# Patient Record
Sex: MALE | Race: OTHER | Employment: STUDENT | URBAN - METROPOLITAN AREA
[De-identification: names, ages, dates, MRNs, and addresses within clinical notes are randomized per-mention and may not be internally consistent; named-entity substitution may affect disease eponyms.]

---

## 2023-11-01 ENCOUNTER — OFFICE VISIT (OUTPATIENT)
Dept: FAMILY MEDICINE CLINIC | Facility: CLINIC | Age: 28
End: 2023-11-01
Payer: COMMERCIAL

## 2023-11-01 VITALS
HEART RATE: 72 BPM | OXYGEN SATURATION: 98 % | BODY MASS INDEX: 22.19 KG/M2 | TEMPERATURE: 98 F | DIASTOLIC BLOOD PRESSURE: 72 MMHG | WEIGHT: 155 LBS | HEIGHT: 70 IN | SYSTOLIC BLOOD PRESSURE: 102 MMHG | RESPIRATION RATE: 16 BRPM

## 2023-11-01 DIAGNOSIS — R06.2 WHEEZING DUE TO ALLERGY: ICD-10-CM

## 2023-11-01 DIAGNOSIS — K62.89 RECTAL IRRITATION: Primary | ICD-10-CM

## 2023-11-01 DIAGNOSIS — T78.40XA WHEEZING DUE TO ALLERGY: ICD-10-CM

## 2023-11-01 PROCEDURE — 99214 OFFICE O/P EST MOD 30 MIN: CPT | Performed by: FAMILY MEDICINE

## 2023-11-01 PROCEDURE — 3074F SYST BP LT 130 MM HG: CPT | Performed by: FAMILY MEDICINE

## 2023-11-01 PROCEDURE — 3078F DIAST BP <80 MM HG: CPT | Performed by: FAMILY MEDICINE

## 2023-11-01 PROCEDURE — 3008F BODY MASS INDEX DOCD: CPT | Performed by: FAMILY MEDICINE

## 2023-11-01 RX ORDER — ALBUTEROL SULFATE 90 UG/1
2 AEROSOL, METERED RESPIRATORY (INHALATION) EVERY 4 HOURS PRN
COMMUNITY
Start: 2022-04-28 | End: 2023-11-01

## 2023-11-01 RX ORDER — TRIAMCINOLONE ACETONIDE 1 MG/G
CREAM TOPICAL 2 TIMES DAILY PRN
Qty: 60 G | Refills: 3 | Status: SHIPPED | OUTPATIENT
Start: 2023-11-01

## 2023-11-01 RX ORDER — ALBUTEROL SULFATE 90 UG/1
2 AEROSOL, METERED RESPIRATORY (INHALATION) EVERY 4 HOURS PRN
Qty: 6.7 G | Refills: 1 | Status: SHIPPED | OUTPATIENT
Start: 2023-11-01

## 2023-11-10 ENCOUNTER — OFFICE VISIT (OUTPATIENT)
Dept: FAMILY MEDICINE CLINIC | Facility: CLINIC | Age: 28
End: 2023-11-10
Payer: COMMERCIAL

## 2023-11-10 VITALS
HEIGHT: 70 IN | DIASTOLIC BLOOD PRESSURE: 60 MMHG | SYSTOLIC BLOOD PRESSURE: 128 MMHG | RESPIRATION RATE: 16 BRPM | WEIGHT: 153.38 LBS | BODY MASS INDEX: 21.96 KG/M2 | TEMPERATURE: 97 F | HEART RATE: 82 BPM

## 2023-11-10 DIAGNOSIS — M25.562 ACUTE PAIN OF LEFT KNEE: Primary | ICD-10-CM

## 2023-11-10 PROCEDURE — 3078F DIAST BP <80 MM HG: CPT | Performed by: FAMILY MEDICINE

## 2023-11-10 PROCEDURE — 99213 OFFICE O/P EST LOW 20 MIN: CPT | Performed by: FAMILY MEDICINE

## 2023-11-10 PROCEDURE — 3008F BODY MASS INDEX DOCD: CPT | Performed by: FAMILY MEDICINE

## 2023-11-10 PROCEDURE — 3074F SYST BP LT 130 MM HG: CPT | Performed by: FAMILY MEDICINE

## 2023-11-10 RX ORDER — MELOXICAM 15 MG/1
15 TABLET ORAL DAILY
Qty: 10 TABLET | Refills: 0 | Status: SHIPPED | OUTPATIENT
Start: 2023-11-10 | End: 2023-11-20

## 2023-11-10 RX ORDER — METHYLPREDNISOLONE 4 MG/1
TABLET ORAL
Qty: 21 EACH | Refills: 0 | Status: SHIPPED | OUTPATIENT
Start: 2023-11-10

## 2023-11-15 ENCOUNTER — TELEPHONE (OUTPATIENT)
Dept: PHYSICAL THERAPY | Facility: HOSPITAL | Age: 28
End: 2023-11-15

## 2023-11-21 ENCOUNTER — OFFICE VISIT (OUTPATIENT)
Facility: LOCATION | Age: 28
End: 2023-11-21
Attending: FAMILY MEDICINE
Payer: COMMERCIAL

## 2023-11-21 DIAGNOSIS — M25.562 ACUTE PAIN OF LEFT KNEE: Primary | ICD-10-CM

## 2023-11-21 PROCEDURE — 97110 THERAPEUTIC EXERCISES: CPT

## 2023-11-21 PROCEDURE — 97161 PT EVAL LOW COMPLEX 20 MIN: CPT

## 2023-11-27 ENCOUNTER — OFFICE VISIT (OUTPATIENT)
Facility: LOCATION | Age: 28
End: 2023-11-27
Attending: FAMILY MEDICINE
Payer: COMMERCIAL

## 2023-11-27 PROCEDURE — 97110 THERAPEUTIC EXERCISES: CPT

## 2023-11-27 NOTE — PROGRESS NOTES
Dx: Acute pain of left knee (M25.562)           Authorized # of Visits:  8  Fall Risk: standard         Precautions: n/a             Subjective: The patient reports overall his knee has continued to feel better  Current Pain Ratin/10  Objective:   See flow sheet    Assessment:   The patient tolerated treatment well with no complaints of knee pain noted with exercises    Plan:   Continue PT to address soft tissue and joint restrictions and provide instruction in a progressive therapeutic exercise program with manual and verbal cueing for proper form and technique    Date: 2023  Tx#:  Date: Tx#: 3/ Date: Tx#: 4/ Date: Tx#: 5/ Date: Tx#: 6/ Date: Tx#: 7/ Date: Tx#: 8/   TherEx (40 min) TherEx TherEx TherEx TherEx TherEx TherEx   Upright bike L5 x 8 min         Bridging with hip abd with blue band 2 x 15         S/L clams blue band 2 x 15         Rockerboard AP/Lateral rocking and balance         Rockerboard AP single leg balance         Shuttle Bilateral 150# 3 x 15         Shuttle R/L 100# 2 x 15         Squats 12# bar x 10 with review of form/technique         Reviewed HEP         HEP: Sidestepping with band, Bridging, S/L clams with band    Charges:  TherEx x 3       Total Timed Treatment: 40 min  Total Treatment Time: 40 min

## 2023-11-29 ENCOUNTER — APPOINTMENT (OUTPATIENT)
Facility: LOCATION | Age: 28
End: 2023-11-29
Attending: FAMILY MEDICINE
Payer: COMMERCIAL

## 2023-12-06 ENCOUNTER — APPOINTMENT (OUTPATIENT)
Facility: LOCATION | Age: 28
End: 2023-12-06
Attending: FAMILY MEDICINE
Payer: COMMERCIAL

## 2023-12-11 ENCOUNTER — OFFICE VISIT (OUTPATIENT)
Facility: LOCATION | Age: 28
End: 2023-12-11
Attending: FAMILY MEDICINE
Payer: COMMERCIAL

## 2023-12-11 PROCEDURE — 97110 THERAPEUTIC EXERCISES: CPT

## 2023-12-11 NOTE — PROGRESS NOTES
Dx: Acute pain of left knee (M25.562)           Authorized # of Visits:  8  Fall Risk: standard         Precautions: n/a             Subjective:   Knee is a bit aggravated today; has not done any fitness/exercise since last week  Current Pain Ratin/10  Objective:   See flow sheet    Assessment:   The patient tolerated treatment well with no complaints of knee pain noted with exercises  Demonstrates some tightness/soft tissue restrictions lateral thigh    Plan:   Continue PT to address soft tissue and joint restrictions and provide instruction in a progressive therapeutic exercise program with manual and verbal cueing for proper form and technique    Date: 2023  Tx#:  Date: 23  Tx#: 3/8 Date: Tx#: 4/ Date: Tx#: / Date: Tx#: / Date: Tx#: 7/ Date: Tx#: 8/   TherEx (40 min) TherEx TherEx TherEx TherEx TherEx TherEx   Upright bike L5 x 8 min Upright bike L5 x 8 min        Bridging with hip abd with blue band 2 x 15 Bridging with hip abd with blue band 2 x 15        S/L clams blue band 2 x 15 S/L clams blue band 2 x 15        Rockerboard AP/Lateral rocking and balance Rockerboard AP/Lateral rocking and balance        Rockerboard AP single leg balance Rockerboard AP single leg balance (NT)        Shuttle Bilateral 150# 3 x 15 Shuttle Bilateral 175# 2 x 15    Shuttle R/L 100# 2 x 15          Shuttle R/L 100# 2 x 15 Squat w/BTB around knees x 20        Squats 12# bar x 10 with review of form/technique Standing clam BTB x 20        Reviewed HEP Side step & MW x 1 lap         Demonstration & return demonstration FR lateral quad, RF, ITB        HEP: Sidestepping with band, Bridging, S/L clams with band    Charges:  TherEx x 3       Total Timed Treatment: 40 min  Total Treatment Time: 40 min

## 2023-12-13 ENCOUNTER — OFFICE VISIT (OUTPATIENT)
Facility: LOCATION | Age: 28
End: 2023-12-13
Attending: FAMILY MEDICINE
Payer: COMMERCIAL

## 2023-12-13 PROCEDURE — 97140 MANUAL THERAPY 1/> REGIONS: CPT

## 2023-12-13 PROCEDURE — 97110 THERAPEUTIC EXERCISES: CPT

## 2023-12-13 NOTE — PROGRESS NOTES
Dx: Acute pain of left knee (M25.562)           Authorized # of Visits:  8  Fall Risk: standard         Precautions: n/a             Subjective:   Feeling a bit better today; did exercises and felt pretty good; a little achy at end of the day   Current Pain Ratin/10 at end of day    Objective:   See flow sheet    Assessment:   Tightness noted lateral thigh - improved with soft tissue treatment (encouraged some soft tissue work - FR, massage gun etc)  Good form with exercises, minimal cueing noted  Tolerated all exercises without pain     Plan:   Continue PT to address soft tissue and joint restrictions and provide instruction in a progressive therapeutic exercise program with manual and verbal cueing for proper form and technique    Date: 2023  Tx#:  Date: 23  Tx#: 3/8 Date: 23  Tx#:  Date: Tx#: 5/ Date: Tx#: 6/ Date: Tx#: 7/ Date:    Tx#: 8/   TherEx (40 min) TherEx TherEx (30 min) TherEx TherEx TherEx TherEx   Upright bike L5 x 8 min Upright bike L5 x 8 min Upright bike L5 x 8 min       Bridging with hip abd with blue band 2 x 15 Bridging with hip abd with blue band 2 x 15 Bridging with hip abd with blue band 2 x 15       S/L clams blue band 2 x 15 S/L clams blue band 2 x 15 S/L clams blue band 2 x 15       Rockerboard AP/Lateral rocking and balance Rockerboard AP/Lateral rocking and balance S/L hip abduction x 15 each       Rockerboard AP single leg balance Rockerboard AP single leg balance (NT) Shuttle Bilateral 175# 2 x 15    Shuttle R/L 125# 2 x 15       Shuttle Bilateral 150# 3 x 15 Shuttle Bilateral 175# 2 x 15    Shuttle R/L 100# 2 x 15   Squat w/BTB around knees x 20       Shuttle R/L 100# 2 x 15 Squat w/BTB around knees x 20 Standing clam BTB x 20       Squats 12# bar x 10 with review of form/technique Standing clam BTB x 20 BTB Side step x 2 laps    BTB MW x 2 laps       Reviewed HEP Side step & MW x 1 lap RDL x 10 each side with dowel         Demonstration & return demonstration FR lateral quad, RF, ITB          Manual Therapy (10 min)         Rolling stick to lateral quad, RF, ITB       HEP: Sidestepping with band, Bridging, S/L clams with band    Charges:  TherEx x 2; manual 1      Total Timed Treatment: 40 min  Total Treatment Time: 40 min

## 2023-12-18 ENCOUNTER — APPOINTMENT (OUTPATIENT)
Facility: LOCATION | Age: 28
End: 2023-12-18
Attending: FAMILY MEDICINE
Payer: COMMERCIAL

## 2023-12-20 ENCOUNTER — APPOINTMENT (OUTPATIENT)
Facility: LOCATION | Age: 28
End: 2023-12-20
Attending: FAMILY MEDICINE
Payer: COMMERCIAL

## 2024-05-10 ENCOUNTER — OFFICE VISIT (OUTPATIENT)
Dept: FAMILY MEDICINE CLINIC | Facility: CLINIC | Age: 29
End: 2024-05-10
Payer: COMMERCIAL

## 2024-05-10 VITALS
DIASTOLIC BLOOD PRESSURE: 62 MMHG | HEIGHT: 70 IN | TEMPERATURE: 98 F | OXYGEN SATURATION: 98 % | BODY MASS INDEX: 21.76 KG/M2 | RESPIRATION RATE: 16 BRPM | WEIGHT: 152 LBS | HEART RATE: 80 BPM | SYSTOLIC BLOOD PRESSURE: 90 MMHG

## 2024-05-10 DIAGNOSIS — H66.90 ACUTE OTITIS MEDIA, UNSPECIFIED OTITIS MEDIA TYPE: Primary | ICD-10-CM

## 2024-05-10 DIAGNOSIS — H60.502 ACUTE OTITIS EXTERNA OF LEFT EAR, UNSPECIFIED TYPE: ICD-10-CM

## 2024-05-10 PROCEDURE — 99213 OFFICE O/P EST LOW 20 MIN: CPT | Performed by: FAMILY MEDICINE

## 2024-05-10 RX ORDER — NEOMYCIN SULFATE, POLYMYXIN B SULFATE, HYDROCORTISONE 3.5; 10000; 1 MG/ML; [USP'U]/ML; MG/ML
2 SOLUTION/ DROPS AURICULAR (OTIC) 3 TIMES DAILY
Qty: 10 ML | Refills: 0 | Status: SHIPPED | OUTPATIENT
Start: 2024-05-10 | End: 2024-05-15

## 2024-05-10 RX ORDER — IBUPROFEN 200 MG
200 TABLET ORAL EVERY 6 HOURS PRN
COMMUNITY

## 2024-05-10 RX ORDER — AMOXICILLIN 500 MG/1
500 CAPSULE ORAL 3 TIMES DAILY
Qty: 30 CAPSULE | Refills: 0 | Status: SHIPPED | OUTPATIENT
Start: 2024-05-10 | End: 2024-05-20

## 2024-05-10 NOTE — PROGRESS NOTES
CHIEF COMPLAINT:     Chief Complaint   Patient presents with    Ear Pain     Patient c/o left ear pressure and feeling water in ear started yesterday        HPI:   Master Leon is a 28 year old male who presents to clinic today with complaints of pain in left ear. Has had for 1  days. Pain is described as painful fullness and started after he cleaned his ear and states \"I blew a huge chunk of wax out of my ear and it has hurt ever since.  Patient denies history of frequent ear infections. Home treatment includes cleaning ears with nasal aspirator (blue bulb) .     Associated symptoms:  Patient reports decreased hearing. Patient denies hearing loss. Patient denies drainage. Patient denies use of cotton tipped ear swabs to clean the ears. Patient reports following URI symptoms: ear pain    Current Outpatient Medications   Medication Sig Dispense Refill    ibuprofen 200 MG Oral Tab Take 1 tablet (200 mg total) by mouth every 6 (six) hours as needed for Pain.      amoxicillin 500 MG Oral Cap Take 1 capsule (500 mg total) by mouth 3 (three) times daily for 10 days. 30 capsule 0    Neomycin-Polymyxin-HC 1 % Otic Solution Place 2 drops in ear(s) in the morning, at noon, and at bedtime for 5 days. 10 mL 0    albuterol 108 (90 Base) MCG/ACT Inhalation Aero Soln Inhale 2 puffs into the lungs every 4 (four) hours as needed for Wheezing or Shortness of Breath. 6.7 g 1      Past Medical History:    Hematuria, microscopic      Social History:  Social History     Socioeconomic History    Marital status: Single   Tobacco Use    Smoking status: Never   Substance and Sexual Activity    Alcohol use: No    Drug use: No     Social Determinants of Health      [facility] from [facility], [facility]    Housing Stability        REVIEW OF SYSTEMS:   GENERAL: See HPI  SKIN: no unusual skin lesions or rashes  HEENT: See HPI  LUNGS: No shortness of breath, or wheezing.  CARDIOVASCULAR: No chest pain,  palpitations  GI: No N/V/C/D.  NEURO: denies headaches or dizziness    EXAM:   BP 90/62   Pulse 80   Temp 97.9 °F (36.6 °C) (Temporal)   Resp 16   Ht 5' 10\" (1.778 m)   Wt 152 lb (68.9 kg)   SpO2 98%   BMI 21.81 kg/m²   GENERAL: well developed, well nourished,in no apparent distress  SKIN: no rashes,no suspicious lesions  HEAD: atraumatic, normocephalic  EYES: conjunctiva clear, EOM intact  EARS: tender tragus and tender with manipulation.  External auditory canals red and inflammed. Right TM: Normal , not bulging, no retraction,no effusion, bony landmarks present.  Left TM: red, + bulging, - retraction,+ effusion, bony landmarks unable to assess..  NOSE: nostrils patent, no nasal mucus, nasal mucosa absent.  THROAT: oral mucosa pink, moist. Posterior pharynx is erythematous and injected. No exudates.  NECK: supple, non-tender  LUNGS: clear to auscultation bilaterally, no wheezes or rhonchi. Breathing is non labored.  CARDIO: RRR without murmur  EXTREMITIES: no cyanosis, clubbing or edema  LYMPH: negative lymphadenopathy.      ASSESSMENT AND PLAN:   Master Leon is a 28 year old male who presents with ear problems symptoms are consistent with    ASSESSMENT:  Encounter Diagnoses   Name Primary?    Acute otitis media, unspecified otitis media type Yes    Acute otitis externa of left ear, unspecified type        PLAN: Meds as listed below.  Comfort measures as described in Patient Instructions    Meds & Refills for this Visit:  Requested Prescriptions     Signed Prescriptions Disp Refills    amoxicillin 500 MG Oral Cap 30 capsule 0     Sig: Take 1 capsule (500 mg total) by mouth 3 (three) times daily for 10 days.    Neomycin-Polymyxin-HC 1 % Otic Solution 10 mL 0     Sig: Place 2 drops in ear(s) in the morning, at noon, and at bedtime for 5 days.         Risk and benefits of medication discussed.   If antibiotics prescribed, stressed importance of completing full course of antibiotic.     Acetaminophen or  NSAID prn pain.      Follow up with clinic if s/sx worsen, do not begin to improve in 3 days, or if fever of 100.4 or greater persists for 72 hours.      Patient voiced understand and is in agreement with treatment plan.    There are no Patient Instructions on file for this visit.

## 2024-09-17 ENCOUNTER — OFFICE VISIT (OUTPATIENT)
Dept: FAMILY MEDICINE CLINIC | Facility: CLINIC | Age: 29
End: 2024-09-17
Payer: COMMERCIAL

## 2024-09-17 VITALS
HEIGHT: 70 IN | TEMPERATURE: 97 F | OXYGEN SATURATION: 99 % | WEIGHT: 150.38 LBS | HEART RATE: 69 BPM | SYSTOLIC BLOOD PRESSURE: 100 MMHG | DIASTOLIC BLOOD PRESSURE: 60 MMHG | RESPIRATION RATE: 18 BRPM | BODY MASS INDEX: 21.53 KG/M2

## 2024-09-17 DIAGNOSIS — H60.503 ACUTE OTITIS EXTERNA OF BOTH EARS, UNSPECIFIED TYPE: Primary | ICD-10-CM

## 2024-09-17 DIAGNOSIS — H61.23 CERUMINOSIS, BILATERAL: ICD-10-CM

## 2024-09-17 PROCEDURE — 99214 OFFICE O/P EST MOD 30 MIN: CPT | Performed by: FAMILY MEDICINE

## 2024-09-17 RX ORDER — CIPROFLOXACIN AND DEXAMETHASONE 3; 1 MG/ML; MG/ML
4 SUSPENSION/ DROPS AURICULAR (OTIC) 2 TIMES DAILY
Qty: 7.5 ML | Refills: 0 | Status: SHIPPED | OUTPATIENT
Start: 2024-09-17

## 2024-09-17 NOTE — PROGRESS NOTES
CHIEF COMPLAINT:     Chief Complaint   Patient presents with    Follow - Up     Patient here for follow up on his ear infections. Patient states both his ears are still itchy and flaky and that his right ear tends to drain more.       HPI:   Master Leon is a 29 year old male who presents to clinic today with complaints of clogged ears.  Patient requesting ear flush. Patient reports ongoing issue with cerumen build up. Patient denies diminished hearing. Has had symptoms for 1  weeks. Home treatment includes antibiotic drops, once finished nothing.     Associated symptoms:  Patient reports drainage. Patient denies use of Q-tips to clean the ears.     Current Outpatient Medications   Medication Sig Dispense Refill    ciprofloxacin-dexamethasone 0.3-0.1 % Otic Suspension Place 4 drops into both ears 2 (two) times daily. 7.5 mL 0    albuterol 108 (90 Base) MCG/ACT Inhalation Aero Soln Inhale 2 puffs into the lungs every 4 (four) hours as needed for Wheezing or Shortness of Breath. 6.7 g 1    ibuprofen 200 MG Oral Tab Take 1 tablet (200 mg total) by mouth every 6 (six) hours as needed for Pain. (Patient not taking: Reported on 9/17/2024)        Past Medical History:    Hematuria, microscopic      Social History:  Social History     Socioeconomic History    Marital status:    Tobacco Use    Smoking status: Never    Smokeless tobacco: Never   Vaping Use    Vaping status: Never Used   Substance and Sexual Activity    Alcohol use: No    Drug use: No     Social Determinants of Health      [facility], [facility]    Housing Stability        REVIEW OF SYSTEMS:   GENERAL: Feeling well otherwise.    HEENT: See HPI      EXAM:   /60 (BP Location: Left arm, Patient Position: Sitting, Cuff Size: adult)   Pulse 69   Temp 97.1 °F (36.2 °C) (Temporal)   Resp 18   Ht 5' 10\" (1.778 m)   Wt 150 lb 6.4 oz (68.2 kg)   SpO2 99%   BMI 21.58 kg/m²   GENERAL: well  developed, well nourished,in no apparent distress    Cerumen Removal Procedure  Patient gave verbal consent.   Risks and benefits of removal were discussed with the patient. Patient agreed to proceed with procedure.    Location: bilateral ears   Indication: TM not visible,fullness.  Prep: Hydrogen Peroxide with warm water via ear elephant.  Removal: Lavage alone was not successful.  There was need for curette/cerumen spoon.  Patient Status: Tolerated Well; No complications      EXAM: TMs healthy, no injection, fluid, or retraction. EACs healthy and without lesions.     ASSESSMENT AND PLAN:   Master Leon is a 29 year old male who presents with \"clogged ears\" possible drainage    ASSESSMENT:  Encounter Diagnoses   Name Primary?    Acute otitis externa of both ears, unspecified type Yes    Ceruminosis, bilateral        PLAN:  Successful cerumen removal. Patient tolerated well without complications.      Patient questions answered.  No further concerns.  Patient will follow up as needed.

## 2024-12-19 ENCOUNTER — OFFICE VISIT (OUTPATIENT)
Dept: FAMILY MEDICINE CLINIC | Facility: CLINIC | Age: 29
End: 2024-12-19
Payer: COMMERCIAL

## 2024-12-19 VITALS
OXYGEN SATURATION: 100 % | WEIGHT: 150 LBS | HEART RATE: 75 BPM | DIASTOLIC BLOOD PRESSURE: 54 MMHG | SYSTOLIC BLOOD PRESSURE: 114 MMHG | HEIGHT: 70 IN | BODY MASS INDEX: 21.47 KG/M2 | RESPIRATION RATE: 16 BRPM | TEMPERATURE: 97 F

## 2024-12-19 DIAGNOSIS — H66.005 RECURRENT ACUTE SUPPURATIVE OTITIS MEDIA WITHOUT SPONTANEOUS RUPTURE OF LEFT TYMPANIC MEMBRANE: ICD-10-CM

## 2024-12-19 DIAGNOSIS — H60.392 OTHER INFECTIVE ACUTE OTITIS EXTERNA OF LEFT EAR: Primary | ICD-10-CM

## 2024-12-19 RX ORDER — CIPROFLOXACIN AND DEXAMETHASONE 3; 1 MG/ML; MG/ML
4 SUSPENSION/ DROPS AURICULAR (OTIC) 2 TIMES DAILY
Qty: 7.5 ML | Refills: 0 | Status: SHIPPED | OUTPATIENT
Start: 2024-12-19 | End: 2024-12-20

## 2024-12-19 NOTE — PROGRESS NOTES
Subjective:      Chief Complaint   Patient presents with    Ear Pain     Left ear swelling, Crust around ear. 4 ear infections in 2024. Alternating ears. No OTC Ear Drops. Was prescribed antibiotics with each ear infection    Vaccinations     Pt denied vaccinations     HISTORY OF PRESENT ILLNESS  Ear Pain       HPI obtained per patient report.  Master Leon is a pleasant 29 year old male presenting with L ear pain.   He reports L ear pain for a few days. He denies any other symptoms. He has had 4 ear infections within the past year and has an ENT evaluation upcoming. He denies swimming, submersion in water, local trauma, q-tip use.    PAST PATIENT HISTORY  Past Medical History:    Hematuria, microscopic     No past surgical history on file.    CURRENT MEDICATIONS  Medications Taking[1]    HEALTH MAINTENANCE  Immunization History   Administered Date(s) Administered    Covid-19 Vaccine Pfizer 30 mcg/0.3 ml 06/25/2021, 07/16/2021    HEP A,Ped/Adol,(2 Dose) 08/16/2013    Meningococcal-Menactra 08/16/2013    TDAP 08/16/2013       ALLERGIES AND DRUG REACTIONS  Allergies[2]    Family History   Problem Relation Age of Onset    Diabetes Maternal Grandmother     Diabetes Paternal Grandfather      Social History     Socioeconomic History    Marital status:    Tobacco Use    Smoking status: Never    Smokeless tobacco: Never   Vaping Use    Vaping status: Never Used   Substance and Sexual Activity    Alcohol use: No    Drug use: No     Social Drivers of Health      Received from [facility], [facility]    Housing Stability       Review of Systems   HENT:  Positive for ear pain.    All other systems reviewed and are negative.         Objective:      /54 (BP Location: Left arm, Patient Position: Sitting, Cuff Size: adult)   Pulse 75   Temp 97.3 °F (36.3 °C) (Temporal)   Resp 16   Ht 5' 10\" (1.778 m)   Wt 150 lb (68 kg)   SpO2 100%   BMI 21.52 kg/m²   Body mass index is  21.52 kg/m².    Physical Exam  Vitals reviewed.   Constitutional:       General: He is not in acute distress.     Appearance: He is not ill-appearing, toxic-appearing or diaphoretic.   HENT:      Head: Normocephalic and atraumatic.      Right Ear: Tympanic membrane, ear canal and external ear normal.      Ears:      Comments: L TM mildly erythematous and bulging  L EAC erythematous  External ear mildly tender  Eyes:      General: No scleral icterus.        Right eye: No discharge.         Left eye: No discharge.      Extraocular Movements: Extraocular movements intact.      Conjunctiva/sclera: Conjunctivae normal.   Cardiovascular:      Rate and Rhythm: Normal rate.   Pulmonary:      Effort: Pulmonary effort is normal.   Abdominal:      General: Abdomen is flat.   Musculoskeletal:      Cervical back: Neck supple.   Skin:     General: Skin is warm and dry.      Coloration: Skin is not jaundiced or pale.      Findings: No bruising, erythema or rash.   Neurological:      Mental Status: He is alert and oriented to person, place, and time.            Assessment and Plan:      1. Other infective acute otitis externa of left ear (Primary)  -     Ciprofloxacin-dexAMETHasone; Place 4 drops into the left ear 2 (two) times daily for 7 days.  Dispense: 7.5 mL; Refill: 0  2. Recurrent acute suppurative otitis media without spontaneous rupture of left tympanic membrane  -     Amoxicillin-Pot Clavulanate; Take 1 tablet by mouth 2 (two) times daily for 7 days.  Dispense: 14 tablet; Refill: 0    Return if symptoms worsen or fail to improve.    - recommended ciprodex ear drops and augmentin as prescribed  - he does not have any clear underlying risk factors for his frequent ear infections this year. He was encouraged to take probiotics during and after his course of antibiotics      Patient verbalized understanding of assessment and recommendations. All questions and concerns were addressed.    Electronically signed by Ho Peacock  MD       [1]   Outpatient Medications Marked as Taking for the 12/19/24 encounter (Office Visit) with Ho Peacock MD   Medication Sig Dispense Refill    ciprofloxacin-dexamethasone 0.3-0.1 % Otic Suspension Place 4 drops into the left ear 2 (two) times daily for 7 days. 7.5 mL 0    amoxicillin clavulanate 875-125 MG Oral Tab Take 1 tablet by mouth 2 (two) times daily for 7 days. 14 tablet 0    albuterol 108 (90 Base) MCG/ACT Inhalation Aero Soln Inhale 2 puffs into the lungs every 4 (four) hours as needed for Wheezing or Shortness of Breath. 6.7 g 1   [2] No Known Allergies

## 2024-12-19 NOTE — PROGRESS NOTES
Subjective:      Chief Complaint   Patient presents with    Ear Pain     Left ear swelling, Crust around ear. 4 ear infections in 2024. Alternating ears. No OTC Ear Drops. Was prescribed antibiotics with each ear infection    Vaccinations     Pt denied vaccinations     HISTORY OF PRESENT ILLNESS  HPI  HPI obtained per patient report.  Master Leon is a pleasant 29 year old male presenting     PAST PATIENT HISTORY  Past Medical History:    Hematuria, microscopic     No past surgical history on file.    CURRENT MEDICATIONS  Medications Taking[1]    HEALTH MAINTENANCE  Immunization History   Administered Date(s) Administered    Covid-19 Vaccine Pfizer 30 mcg/0.3 ml 06/25/2021, 07/16/2021    HEP A,Ped/Adol,(2 Dose) 08/16/2013    Meningococcal-Menactra 08/16/2013    TDAP 08/16/2013       ALLERGIES AND DRUG REACTIONS  Allergies[2]    Family History   Problem Relation Age of Onset    Diabetes Maternal Grandmother     Diabetes Paternal Grandfather      Social History     Socioeconomic History    Marital status:    Tobacco Use    Smoking status: Never    Smokeless tobacco: Never   Vaping Use    Vaping status: Never Used   Substance and Sexual Activity    Alcohol use: No    Drug use: No     Social Drivers of Health      Received from [facility], [facility]    Housing Stability       Review of Systems       Objective:      /54 (BP Location: Left arm, Patient Position: Sitting, Cuff Size: adult)   Pulse 75   Temp 97.3 °F (36.3 °C) (Temporal)   Resp 16   Ht 5' 10\" (1.778 m)   Wt 150 lb (68 kg)   SpO2 100%   BMI 21.52 kg/m²   Body mass index is 21.52 kg/m².    Physical Exam       Assessment and Plan:      There are no diagnoses linked to this encounter.  No follow-ups on file.        Patient verbalized understanding of assessment and recommendations. All questions and concerns were addressed.    Electronically signed by Ho Peacock MD       [1]   Outpatient Medications  Marked as Taking for the 12/19/24 encounter (Office Visit) with Ho Peacock MD   Medication Sig Dispense Refill    albuterol 108 (90 Base) MCG/ACT Inhalation Aero Soln Inhale 2 puffs into the lungs every 4 (four) hours as needed for Wheezing or Shortness of Breath. 6.7 g 1   [2] No Known Allergies

## 2024-12-20 ENCOUNTER — PATIENT MESSAGE (OUTPATIENT)
Dept: FAMILY MEDICINE CLINIC | Facility: CLINIC | Age: 29
End: 2024-12-20

## 2024-12-20 DIAGNOSIS — H60.392 OTHER INFECTIVE ACUTE OTITIS EXTERNA OF LEFT EAR: Primary | ICD-10-CM

## 2024-12-20 RX ORDER — NEOMYCIN SULFATE, POLYMYXIN B SULFATE AND HYDROCORTISONE 10; 3.5; 1 MG/ML; MG/ML; [USP'U]/ML
4 SUSPENSION/ DROPS AURICULAR (OTIC) 4 TIMES DAILY
Qty: 10 ML | Refills: 0 | Status: SHIPPED | OUTPATIENT
Start: 2024-12-20 | End: 2024-12-27

## 2025-01-16 ENCOUNTER — OFFICE VISIT (OUTPATIENT)
Dept: FAMILY MEDICINE CLINIC | Facility: CLINIC | Age: 30
End: 2025-01-16
Payer: COMMERCIAL

## 2025-01-16 VITALS
SYSTOLIC BLOOD PRESSURE: 110 MMHG | WEIGHT: 151.5 LBS | DIASTOLIC BLOOD PRESSURE: 60 MMHG | HEIGHT: 70 IN | BODY MASS INDEX: 21.69 KG/M2 | RESPIRATION RATE: 16 BRPM | OXYGEN SATURATION: 99 % | TEMPERATURE: 98 F | HEART RATE: 84 BPM

## 2025-01-16 DIAGNOSIS — H93.233 HYPERACUSIS OF BOTH EARS: Primary | ICD-10-CM

## 2025-01-16 PROCEDURE — 99213 OFFICE O/P EST LOW 20 MIN: CPT | Performed by: STUDENT IN AN ORGANIZED HEALTH CARE EDUCATION/TRAINING PROGRAM

## 2025-01-16 PROCEDURE — 3078F DIAST BP <80 MM HG: CPT | Performed by: STUDENT IN AN ORGANIZED HEALTH CARE EDUCATION/TRAINING PROGRAM

## 2025-01-16 PROCEDURE — 3008F BODY MASS INDEX DOCD: CPT | Performed by: STUDENT IN AN ORGANIZED HEALTH CARE EDUCATION/TRAINING PROGRAM

## 2025-01-16 PROCEDURE — 3074F SYST BP LT 130 MM HG: CPT | Performed by: STUDENT IN AN ORGANIZED HEALTH CARE EDUCATION/TRAINING PROGRAM

## 2025-01-16 NOTE — PROGRESS NOTES
Subjective:      Chief Complaint   Patient presents with    Ear Problem     F/up - states recently he has had ear ringing, feels like his eardrum is vibrating - denies any pain     HISTORY OF PRESENT ILLNESS  Ear Problem       HPI obtained per patient report.  Master Leon is a pleasant 29 year old male presenting with ear symptoms.   He reports B/L, L>R, eardrum vibrating sensation when exposed to loud noises. He denies any other symptoms including hearing loss, tinnitus, pain. These symptoms began a few days ago.    PAST PATIENT HISTORY  Past Medical History:    Hematuria, microscopic     No past surgical history on file.    CURRENT MEDICATIONS  Medications Taking[1]    HEALTH MAINTENANCE  Immunization History   Administered Date(s) Administered    Covid-19 Vaccine Pfizer 30 mcg/0.3 ml 06/25/2021, 07/16/2021    HEP A,Ped/Adol,(2 Dose) 08/16/2013    Meningococcal-Menactra 08/16/2013    TDAP 08/16/2013       ALLERGIES AND DRUG REACTIONS  Allergies[2]    Family History   Problem Relation Age of Onset    Diabetes Maternal Grandmother     Diabetes Paternal Grandfather      Social History     Socioeconomic History    Marital status:    Tobacco Use    Smoking status: Never    Smokeless tobacco: Never   Vaping Use    Vaping status: Never Used   Substance and Sexual Activity    Alcohol use: No    Drug use: No   Other Topics Concern    Caffeine Concern No    Exercise No    Seat Belt No    Special Diet No    Stress Concern No    Weight Concern No     Social Drivers of Health      Received from [facility], [facility]    Housing Stability       Review of Systems   All other systems reviewed and are negative.         Objective:      /60   Pulse 84   Temp 97.7 °F (36.5 °C) (Temporal)   Resp 16   Ht 5' 10\" (1.778 m)   Wt 151 lb 8 oz (68.7 kg)   SpO2 99%   BMI 21.74 kg/m²   Body mass index is 21.74 kg/m².    Physical Exam  Vitals reviewed.   Constitutional:        General: He is not in acute distress.     Appearance: He is not ill-appearing, toxic-appearing or diaphoretic.   HENT:      Head: Normocephalic and atraumatic.      Right Ear: Tympanic membrane, ear canal and external ear normal.      Left Ear: Tympanic membrane, ear canal and external ear normal.   Cardiovascular:      Rate and Rhythm: Normal rate.   Pulmonary:      Effort: Pulmonary effort is normal.   Abdominal:      General: Abdomen is flat.   Musculoskeletal:      Cervical back: Neck supple.   Neurological:      Mental Status: He is alert and oriented to person, place, and time.   Psychiatric:         Mood and Affect: Mood normal.            Assessment and Plan:      1. Hyperacusis of both ears (Primary)    Return if symptoms worsen or fail to improve.    - physical exam is benign today  - recommended avoidance of exposure to loud noises  - he has an ENT consultation and audiology exam upcoming on 2/26 which he was encouraged to keep for further evaluation    Patient verbalized understanding of assessment and recommendations. All questions and concerns were addressed.    Electronically signed by Ho Peacock MD       [1]   Outpatient Medications Marked as Taking for the 1/16/25 encounter (Office Visit) with [facility] Peacock MD   Medication Sig Dispense Refill    albuterol 108 (90 Base) MCG/ACT Inhalation Aero Soln Inhale 2 puffs into the lungs every 4 (four) hours as needed for Wheezing or Shortness of Breath. 6.7 g 1   [2] No Known Allergies

## 2025-02-26 ENCOUNTER — OFFICE VISIT (OUTPATIENT)
Facility: LOCATION | Age: 30
End: 2025-02-26
Payer: COMMERCIAL

## 2025-02-26 DIAGNOSIS — H93.293 ABNORMAL AUDITORY PERCEPTION OF BOTH EARS: Primary | ICD-10-CM

## 2025-02-26 DIAGNOSIS — H60.63 CHRONIC OTITIS EXTERNA OF BOTH EARS, UNSPECIFIED TYPE: Primary | ICD-10-CM

## 2025-02-26 PROCEDURE — 99204 OFFICE O/P NEW MOD 45 MIN: CPT | Performed by: OTOLARYNGOLOGY

## 2025-02-26 PROCEDURE — 92567 TYMPANOMETRY: CPT | Performed by: AUDIOLOGIST

## 2025-02-26 PROCEDURE — 92557 COMPREHENSIVE HEARING TEST: CPT | Performed by: AUDIOLOGIST

## 2025-02-26 NOTE — PROGRESS NOTES
Master Leon is a 29 year old male. No chief complaint on file.    HPI:   29-year-old white male appears to be having recurrent otitis externa's does irrigate his ears at least once a month.  When he gets the problem he gets swelling also has chronic itching ears hearing has not seem to be affected denies tinnitus.  Additionally a lot of this went away once he got his braces off as far as pain in the ears is concerned.  Current Outpatient Medications   Medication Sig Dispense Refill    albuterol 108 (90 Base) MCG/ACT Inhalation Aero Soln Inhale 2 puffs into the lungs every 4 (four) hours as needed for Wheezing or Shortness of Breath. 6.7 g 1      Past Medical History:    Hematuria, microscopic      Social History:  Social History     Socioeconomic History    Marital status:    Tobacco Use    Smoking status: Never    Smokeless tobacco: Never   Vaping Use    Vaping status: Never Used   Substance and Sexual Activity    Alcohol use: No    Drug use: No   Other Topics Concern    Caffeine Concern No    Exercise No    Seat Belt No    Special Diet No    Stress Concern No    Weight Concern No     Social Drivers of Health      [facility]    Housing Stability      History reviewed. No pertinent surgical history.      REVIEW OF SYSTEMS:   GENERAL HEALTH: feels well otherwise  GENERAL : denies fever, chills, sweats, weight loss, weight gain  SKIN: denies any unusual skin lesions or rashes  RESPIRATORY: denies shortness of breath with exertion  NEURO: denies headaches    EXAM:   There were no vitals taken for this visit.    System Pertinent findings Details   Constitutional  Overall appearance - Normal.   Psychiatric  Orientation - Oriented to time, place, person & situation. Appropriate mood and affect.   Head/Face  Facial features -- Normal. Skull - Normal.   Eyes  Pupils equal ,round ,react to light and accomidate   Ears  External Ear Right: Normal,  Left: Normal. Canal - Right: Normal, Left: Normal. TM - Right: Devoid of wax TM normal left: Devoid of wax TM normal   Nose  External Nose, Normal, Septum -midline nasal Vault, clear,Turbinates - Right: Normal left: Normal   Mouth/Throat  Lips/teeth/gums - Normal. Tonsils -1+. Oropharynx - Normal.   Neck Exam  Inspection - Normal. Palpation - Normal. Parotid gland - Normal. Thyroid gland -normal   Neurological  Cranial nerves - Cranial nerves II through XII grossly intact.   Nasopharynx   Normal        Lymph Detail  Submental. Submandibular. Anterior cervical. Posterior cervical. Supraclavicular.   Audiogram today was normal    ASSESSMENT AND PLAN:   1. Bilateral recurrent otitis externa  Derm otic eardrops with refill follow-up 6 to 8 months      The patient indicates understanding of these issues and agrees to the plan.      Rich Mauro MD  2/26/2025  4:23 PM

## 2025-02-26 NOTE — PROGRESS NOTES
Master Leon was seen for audiometric evaluation today.  Referred back to physician.     Chilo Smallwood

## 2025-03-25 ENCOUNTER — TELEPHONE (OUTPATIENT)
Facility: LOCATION | Age: 30
End: 2025-03-25

## 2025-03-25 RX ORDER — FLUOCINOLONE ACETONIDE 0.11 MG/ML
3 OIL AURICULAR (OTIC) 3 TIMES DAILY
Qty: 20 ML | Refills: 0 | Status: SHIPPED | OUTPATIENT
Start: 2025-03-25 | End: 2025-04-01

## 2025-07-07 ENCOUNTER — OFFICE VISIT (OUTPATIENT)
Dept: FAMILY MEDICINE CLINIC | Facility: CLINIC | Age: 30
End: 2025-07-07
Payer: COMMERCIAL

## 2025-07-07 VITALS
WEIGHT: 155 LBS | OXYGEN SATURATION: 97 % | SYSTOLIC BLOOD PRESSURE: 110 MMHG | TEMPERATURE: 97 F | BODY MASS INDEX: 22.19 KG/M2 | DIASTOLIC BLOOD PRESSURE: 60 MMHG | HEART RATE: 71 BPM | RESPIRATION RATE: 16 BRPM | HEIGHT: 70 IN

## 2025-07-07 DIAGNOSIS — K62.5 RECTAL BLEEDING: ICD-10-CM

## 2025-07-07 DIAGNOSIS — Z00.00 WELL ADULT EXAM: Primary | ICD-10-CM

## 2025-07-07 DIAGNOSIS — B35.6 TINEA CRURIS: ICD-10-CM

## 2025-07-07 DIAGNOSIS — K59.00 CONSTIPATION, UNSPECIFIED CONSTIPATION TYPE: ICD-10-CM

## 2025-07-07 RX ORDER — KETOCONAZOLE 20 MG/G
1 CREAM TOPICAL DAILY
Qty: 15 G | Refills: 0 | Status: SHIPPED | OUTPATIENT
Start: 2025-07-07

## 2025-07-07 NOTE — PROGRESS NOTES
Subjective:      Chief Complaint   Patient presents with    Physical    Rash     On penis area - denies any pain/itching - denies any discharge    Immunization/Injection     Pt declined TDAP vaccine today     HISTORY OF PRESENT ILLNESS  HPI  HPI obtained per patient report.  Master Leon is a pleasant 30 year old male presenting for his annual physical.     He is also here to address ongoing rectal bleeding and a new penile rash.     He reports rectal pain and a small amount of BRB with BM's intermittently for a few years. He reports associated constipation and associated straining. He has applied preparation H without consistent relief. He inquires about having a colonoscopy for further evaluation.    He reports 1 new skin lesions on his penis. He first noticed 1 week ago. He denies associated pain/itching/discharge. He denies a previous history of similar symptoms. He is monogamous with his wife for more than 7 years.     PAST PATIENT HISTORY  Past Medical History[1]  Past Surgical History[2]    CURRENT MEDICATIONS  Medications Taking[3]    HEALTH MAINTENANCE  Immunization History   Administered Date(s) Administered    Covid-19 Vaccine Pfizer 30 mcg/0.3 ml 06/25/2021, 07/16/2021    HEP A,Ped/Adol,(2 Dose) 08/16/2013    Meningococcal-Menactra 08/16/2013    TDAP 08/16/2013       ALLERGIES AND DRUG REACTIONS  Allergies[4]    Family History[5]  Short Social Hx on File[6]    Review of Systems   Gastrointestinal:  Positive for anal bleeding, constipation and rectal pain.   Skin:  Positive for rash.   All other systems reviewed and are negative.         Objective:      /60   Pulse 71   Temp 97 °F (36.1 °C) (Temporal)   Resp 16   Ht 5' 10\" (1.778 m)   Wt 155 lb (70.3 kg)   SpO2 97%   BMI 22.24 kg/m²   Body mass index is 22.24 kg/m².    Physical Exam  Vitals reviewed.   Constitutional:       General: He is not in acute distress.     Appearance: He is not ill-appearing, toxic-appearing or diaphoretic.    HENT:      Head: Normocephalic and atraumatic.   Eyes:      General: No scleral icterus.        Right eye: No discharge.         Left eye: No discharge.      Extraocular Movements: Extraocular movements intact.      Conjunctiva/sclera: Conjunctivae normal.   Neck:      Thyroid: No thyroid mass, thyromegaly or thyroid tenderness.   Cardiovascular:      Rate and Rhythm: Normal rate and regular rhythm.      Heart sounds: Normal heart sounds.   Pulmonary:      Effort: Pulmonary effort is normal.      Breath sounds: Normal breath sounds.   Abdominal:      General: Abdomen is flat. There is no distension.      Palpations: Abdomen is soft. There is no mass.      Tenderness: There is no abdominal tenderness. There is no guarding or rebound.      Hernia: No hernia is present.   Musculoskeletal:      Cervical back: Neck supple. No tenderness.      Right lower leg: No edema.      Left lower leg: No edema.   Lymphadenopathy:      Cervical: No cervical adenopathy.   Skin:     General: Skin is warm and dry.      Coloration: Skin is not jaundiced or pale.      Findings: Rash (2 ovoid mildly erythematous papules up to 8mm in diameter with irregular, mildly raised borders located on right and left side of penile shaft) present. No bruising.   Neurological:      Mental Status: He is alert and oriented to person, place, and time.   Psychiatric:         Mood and Affect: Mood normal.            Assessment and Plan:      1. Well adult exam (Primary)  -     CBC With Differential With Platelet  -     Comp Metabolic Panel (14)  -     Lipid Panel  -     TSH W Reflex To Free T4  -     Vitamin D, 25-Hydroxy  2. Rectal bleeding  -     GASTRO - INTERNAL  3. Constipation, unspecified constipation type  -     GASTRO - INTERNAL  4. Tinea cruris  -     Ketoconazole; Apply 1 Application topically daily.  Dispense: 15 g; Refill: 0    Return in about 1 year (around 7/7/2026) for physical.    Physical  - he declined Tdap booster administration  today    Rectal bleeding  - likely 2/2 anal fissure or hemorrhoids associated with constipation  - recommended increasing his water intake, gradually increasing his fiber intake, and colace as needed OTC  - we discussed that he may use OTC topical preparation H for up to 2 weeks and OTC topical preparation H with lidocaine as needed  - if his symptoms do not resolve despite the above, recommended consulting with GI for further evaluation, including possible endoscopy    Tinea cruris  -  mild  - recommended topical ketoconazole as prescribed until rash resolves  - recommended keeping area dry as much as possible     Patient verbalized understanding of assessment and recommendations. All questions and concerns were addressed.    Electronically signed by Ho Peacock MD         [1]   Past Medical History:   Hematuria, microscopic   [2] No past surgical history on file.  [3]   Outpatient Medications Marked as Taking for the 7/7/25 encounter (Office Visit) with Ho Peacock MD   Medication Sig Dispense Refill    ketoconazole 2 % External Cream Apply 1 Application topically daily. 15 g 0    albuterol 108 (90 Base) MCG/ACT Inhalation Aero Soln Inhale 2 puffs into the lungs every 4 (four) hours as needed for Wheezing or Shortness of Breath. 6.7 g 1   [4] No Known Allergies  [5]   Family History  Problem Relation Age of Onset    Diabetes Maternal Grandmother     Diabetes Paternal Grandfather    [6]   Social History  Socioeconomic History    Marital status:    Tobacco Use    Smoking status: Never    Smokeless tobacco: Never   Vaping Use    Vaping status: Never Used   Substance and Sexual Activity    Alcohol use: No    Drug use: No   Other Topics Concern    Caffeine Concern No    Exercise No    Seat Belt No    Special Diet No    Stress Concern No    Weight Concern No     Social Drivers of Health     Food Insecurity: No Food Insecurity (7/7/2025)    NCSS - Food Insecurity     Worried About Running Out of Food in the Last  Year: No     Ran Out of Food in the Last Year: No   Transportation Needs: No Transportation Needs (7/7/2025)    NCSS - Transportation     Lack of Transportation: No   Housing Stability: Not At Risk (7/7/2025)    NCSS - Housing/Utilities     Has Housing: Yes     Worried About Losing Housing: No     Unable to Get Utilities: No